# Patient Record
Sex: MALE | Race: BLACK OR AFRICAN AMERICAN | NOT HISPANIC OR LATINO | Employment: UNEMPLOYED | URBAN - METROPOLITAN AREA
[De-identification: names, ages, dates, MRNs, and addresses within clinical notes are randomized per-mention and may not be internally consistent; named-entity substitution may affect disease eponyms.]

---

## 2018-05-04 ENCOUNTER — HOSPITAL ENCOUNTER (EMERGENCY)
Facility: HOSPITAL | Age: 33
Discharge: HOME | End: 2018-05-04
Payer: COMMERCIAL

## 2018-05-04 VITALS
TEMPERATURE: 99 F | OXYGEN SATURATION: 98 % | BODY MASS INDEX: 24.05 KG/M2 | RESPIRATION RATE: 19 BRPM | HEART RATE: 61 BPM | HEIGHT: 70 IN | SYSTOLIC BLOOD PRESSURE: 163 MMHG | DIASTOLIC BLOOD PRESSURE: 100 MMHG | WEIGHT: 168 LBS

## 2018-05-04 DIAGNOSIS — M54.50 BILATERAL LOW BACK PAIN WITHOUT SCIATICA, UNSPECIFIED CHRONICITY: Primary | ICD-10-CM

## 2018-05-04 DIAGNOSIS — Z72.0 TOBACCO USE: ICD-10-CM

## 2018-05-04 DIAGNOSIS — R03.0 ELEVATED BLOOD-PRESSURE READING WITHOUT DIAGNOSIS OF HYPERTENSION: ICD-10-CM

## 2018-05-04 PROCEDURE — 99283 EMERGENCY DEPT VISIT LOW MDM: CPT

## 2018-05-04 RX ORDER — NAPROXEN 500 MG/1
500 TABLET ORAL 2 TIMES DAILY WITH MEALS
Qty: 12 TABLET | Refills: 0 | Status: SHIPPED | OUTPATIENT
Start: 2018-05-04

## 2018-05-04 RX ORDER — ORPHENADRINE CITRATE 100 MG/1
100 TABLET, EXTENDED RELEASE ORAL 2 TIMES DAILY
Qty: 12 TABLET | Refills: 0 | Status: SHIPPED | OUTPATIENT
Start: 2018-05-04

## 2018-05-04 NOTE — ED PROVIDER NOTES
History      Chief Complaint   Patient presents with    Muscle Pain     back pain       Review of patient's allergies indicates:  No Known Allergies     HPI   HPI    5/4/2018, 5:53 PM   History obtained from the patient      History of Present Illness: Marin Yu is a 32 y.o. male patient who presents to the Emergency Department for bilateral low back pain for 3 days. Symptoms are constant and moderate in severity.  The patient describes the symptoms as achy.  Denies injury, bladder/bowel dysfunction, fever, saddle anesthesia, or focal weakness.  Worse with movement.   No further complaints or concerns at this time.     Blood pressure is elevated.  Pt denies cp, sob, ha, dizziness, vision change.          PCP: No primary care provider on file.       Past Medical History:  History reviewed. No pertinent past medical history.      Past Surgical History:  History reviewed. No pertinent surgical history.        Family History:  History reviewed. No pertinent family history.        Social History:  Social History     Social History Main Topics    Smoking status: Current Every Day Smoker    Smokeless tobacco: Not on file    Alcohol use Not on file    Drug use: Unknown    Sexual activity: Not on file       ROS   Review of Systems   Constitutional: Negative for chills and fever.   HENT: Negative for sore throat.    Respiratory: Negative for shortness of breath.    Cardiovascular: Negative for chest pain.   Gastrointestinal: Negative for nausea and vomiting.   Genitourinary: Negative for decreased urine volume, difficulty urinating, dysuria and flank pain.   Musculoskeletal: Positive for back pain. Negative for neck stiffness.   Skin: Negative for rash and wound.   Neurological: Negative for weakness and numbness.   Hematological: Does not bruise/bleed easily.   All other systems reviewed and are negative.    Review of Systems    Physical Exam      Initial Vitals [05/04/18 1746]   BP Pulse Resp Temp SpO2  "  (!) 163/100 61 19 99 °F (37.2 °C) 98 %      MAP       121         Physical Exam  Vital signs and nursing notes reviewed.  Constitutional: Patient is in NAD. Awake and alert. Well-developed and well-nourished.  Head: Atraumatic. Normocephalic.  Eyes: PERRL. EOM intact. Conjunctivae nl. No scleral icterus.  ENT: Mucous membranes are moist. Oropharynx is clear.  Neck: Supple. No JVD. No lymphadenopathy.  No meningismus.  Nontender  Cardiovascular: Regular rate and rhythm. No murmurs, rubs, or gallops. Distal pulses are 2+ and symmetric.  Pulmonary/Chest: No respiratory distress. Clear to auscultation bilaterally. No wheezing, rales, or rhonchi.  Abdominal: Soft. Non-distended. No TTP. No rebound, guarding, or rigidity. Good bowel sounds.  Genitourinary: No CVA tenderness  Musculoskeletal: Moves all extremities. No edema.   Non tender c/t spine.  Lumbar spine with mild bilateral paraspinous ttp, no midline ttp or step.  Skin: Warm and dry.  Neurological: Awake and alert. No acute focal neurological deficits are appreciated.  5/5 x 4 strength.  Strong and equal foot plantar and dorsiflexion.  Psychiatric: Normal affect. Good eye contact. Appropriate in content.      ED Course      Procedures  ED Vital Signs:  Vitals:    05/04/18 1746   BP: (!) 163/100   Pulse: 61   Resp: 19   Temp: 99 °F (37.2 °C)   TempSrc: Oral   SpO2: 98%   Weight: 76.2 kg (168 lb)   Height: 5' 10" (1.778 m)                 Imaging Results:  Imaging Results    None            The Emergency Provider reviewed the vital signs and test results, which are outlined above.    ED Discussion         Medication(s) given in the ER:  Medications - No data to display        Follow-up Information     [facility] In 3 days.    Contact information:  [address] [address] [phone]                       Discharge Medication List as of 5/4/2018  5:56 PM      START taking these medications    Details   naproxen (NAPROSYN) 500 MG " tablet Take 1 tablet (500 mg total) by mouth 2 (two) times daily with meals. Prn pain, Starting Fri 5/4/2018, Print      orphenadrine (NORFLEX) 100 mg tablet Take 1 tablet (100 mg total) by mouth 2 (two) times daily., Starting Fri 5/4/2018, Print                Medical Decision Making        All findings were reviewed with the patient/family in detail.   All remaining questions and concerns were addressed at that time.  Patient/family has been counseled regarding the need for follow-up as well as the indication to return to the emergency room should new or worrisome developments occur.    Pre-hypertension/Hypertension: The pt has been informed that they may have pre-hypertension or hypertension based on a blood pressure reading in the ED. I recommend that the pt call the PCP listed on their discharge instructions or a physician of their choice this week to arrange f/u for further evaluation of possible pre-hypertension or hypertension.             MDM   Additional MDM:   Hypertension: The patient has hypertension (no treatment required at this time). The patient's condition was felt to be stable.   Smoking Cessation: The patient was counseled on tobacco related  health complications. Appropriate patient literature was given to the patient concerning tobacco cessation.           Attending Attestation:     Physician Attestation Statement for NP/PA:   I reviewed the chart but I did not personally examine the patient. The face to face encounter was performed by the NP/PA.                Clinical Impression:        ICD-10-CM ICD-9-CM   1. Bilateral low back pain without sciatica, unspecified chronicity M54.5 724.2   2. Elevated blood-pressure reading without diagnosis of hypertension R03.0 796.2   3. Tobacco use Z72.0 305.1             Lourdes Brock PA-C  05/04/18 1759       Bruno Henriquez MD  05/05/18 0011